# Patient Record
Sex: FEMALE | Race: WHITE | NOT HISPANIC OR LATINO | Employment: PART TIME | URBAN - METROPOLITAN AREA
[De-identification: names, ages, dates, MRNs, and addresses within clinical notes are randomized per-mention and may not be internally consistent; named-entity substitution may affect disease eponyms.]

---

## 2023-11-30 PROBLEM — E66.811 OBESITY (BMI 30.0-34.9): Status: ACTIVE | Noted: 2023-11-30

## 2024-08-12 PROBLEM — Z30.430 ENCOUNTER FOR IUD INSERTION: Status: ACTIVE | Noted: 2024-08-12

## 2024-09-18 ENCOUNTER — APPOINTMENT (OUTPATIENT)
Dept: OBSTETRICS AND GYNECOLOGY | Facility: CLINIC | Age: 24
End: 2024-09-18
Payer: COMMERCIAL

## 2024-09-18 VITALS — WEIGHT: 205.4 LBS | SYSTOLIC BLOOD PRESSURE: 110 MMHG | DIASTOLIC BLOOD PRESSURE: 74 MMHG | BODY MASS INDEX: 32.9 KG/M2

## 2024-09-18 DIAGNOSIS — Z30.430 ENCOUNTER FOR IUD INSERTION: Primary | ICD-10-CM

## 2024-09-18 DIAGNOSIS — N80.9 ENDOMETRIOSIS: ICD-10-CM

## 2024-09-18 PROCEDURE — 99213 OFFICE O/P EST LOW 20 MIN: CPT | Performed by: STUDENT IN AN ORGANIZED HEALTH CARE EDUCATION/TRAINING PROGRAM

## 2024-09-18 RX ORDER — LEVONORGESTREL 52 MG/1
1 INTRAUTERINE DEVICE INTRAUTERINE ONCE
COMMUNITY
Start: 2024-08-12

## 2024-09-18 NOTE — PROGRESS NOTES
Subjective   Patient ID: Janet Del Cid is a 24 y.o. female who presents for est patient follow up gyn (IUD string check /Still having pain and cramping with Myfembri/Bled for 3 weeks ).  Patient is having persistent pain despite Myfembree and and IUD.     No fevers, chills. No HA/vision changes. No RUQ pain, n/v. No chest pain or SOB. No calf pain.          Review of Systems   All other systems reviewed and are negative.      Objective   Physical Exam  General: A&Ox3  Head: Normocephalic, atraumatic  Heart/Lungs: RRR, No murmurs, gallops, or rubs. Lungs are CTAB.  Breast: Normal in appearance bilaterally. No skin changes. No rashes or bruises. No palpable masses.  Abdomen: Soft, nontender. BS+4. No bruising or masses.  Genitourinary: Labia and vagina normal in appearance. Uterus is small, mobile, anteverted. No adnexal masses palpated.  Lower Extremities: No lower extremity Edema no palpable cords.     A chaperone was present for the exam.    Assessment/Plan   Problem List Items Addressed This Visit       Encounter for IUD insertion - Primary    Overview     Placed without difficulty.    Strings visualized.         Endometriosis    Overview     I reviewed the images once more in more detail. There do appear to be some implants of endometriosis of the uterosacral ligament. We discussed multiple options. Patient has failed OCPs    She has not had much improvement with IUD and Myfembree so far. We discussed continued monitoring vs operative laparoscopy. Patient will consider her options pending response to Myfembree as she has not  had a full trial of it yet.    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 Continue PFPT.                     Jens Morris MD 10/05/24 12:22 PM

## 2024-10-05 PROBLEM — R10.2 PELVIC PAIN: Status: RESOLVED | Noted: 2023-11-30 | Resolved: 2024-10-05
